# Patient Record
Sex: FEMALE | Race: WHITE | NOT HISPANIC OR LATINO | URBAN - METROPOLITAN AREA
[De-identification: names, ages, dates, MRNs, and addresses within clinical notes are randomized per-mention and may not be internally consistent; named-entity substitution may affect disease eponyms.]

---

## 2018-04-17 ENCOUNTER — DOCTOR'S OFFICE (OUTPATIENT)
Dept: URBAN - METROPOLITAN AREA CLINIC 165 | Facility: CLINIC | Age: 83
Setting detail: OPHTHALMOLOGY
End: 2018-04-17
Payer: MEDICARE

## 2018-04-17 DIAGNOSIS — D31.32: ICD-10-CM

## 2018-04-17 DIAGNOSIS — H57.12: ICD-10-CM

## 2018-04-17 DIAGNOSIS — H04.123: ICD-10-CM

## 2018-04-17 DIAGNOSIS — H25.013: ICD-10-CM

## 2018-04-17 DIAGNOSIS — H01.001: ICD-10-CM

## 2018-04-17 DIAGNOSIS — H43.812: ICD-10-CM

## 2018-04-17 DIAGNOSIS — H25.043: ICD-10-CM

## 2018-04-17 DIAGNOSIS — H25.13: ICD-10-CM

## 2018-04-17 DIAGNOSIS — H11.153: ICD-10-CM

## 2018-04-17 PROCEDURE — 92014 COMPRE OPH EXAM EST PT 1/>: CPT | Performed by: OPHTHALMOLOGY

## 2018-04-17 PROCEDURE — 92250 FUNDUS PHOTOGRAPHY W/I&R: CPT | Performed by: OPHTHALMOLOGY

## 2018-04-17 ASSESSMENT — SPHEQUIV_DERIVED
OS_SPHEQUIV: 0
OD_SPHEQUIV: 0.875
OD_SPHEQUIV: 0.5
OS_SPHEQUIV: 0.125

## 2018-04-17 ASSESSMENT — REFRACTION_MANIFEST
OD_VA2: 20/20
OD_VA3: 20/
OD_VA1: 20/60+
OS_SPHERE: -0.75
OD_SPHERE: -0.50
OS_ADD: +3.00
OD_AXIS: 170
OU_VA: 20/
OS_VA1: 20/
OS_VA3: 20/
OD_CYLINDER: +2.75
OS_VA2: 20/
OU_VA: 20/
OS_AXIS: 015
OS_CYLINDER: +1.50
OD_VA2: 20/
OS_VA3: 20/
OS_VA2: 20/BINOC
OD_VA1: 20/
OD_VA3: 20/
OD_ADD: +3.00
OS_VA1: 20/30-

## 2018-04-17 ASSESSMENT — LID EXAM ASSESSMENTS
OS_MEIBOMITIS: LUL T 1+
OD_MEIBOMITIS: RUL T 1+
OS_BLEPHARITIS: ABSENT
OD_BLEPHARITIS: ABSENT

## 2018-04-17 ASSESSMENT — REFRACTION_CURRENTRX
OD_VPRISM_DIRECTION: SV
OS_OVR_VA: 20/
OD_OVR_VA: 20/
OD_OVR_VA: 20/
OS_VPRISM_DIRECTION: SV
OD_SPHERE: +2.00
OS_OVR_VA: 20/
OS_OVR_VA: 20/
OS_SPHERE: +2.00
OD_OVR_VA: 20/

## 2018-04-17 ASSESSMENT — CONFRONTATIONAL VISUAL FIELD TEST (CVF)
OS_FINDINGS: FULL
OD_FINDINGS: FULL

## 2018-04-17 ASSESSMENT — VISUAL ACUITY: OD_BCVA: 20/30+

## 2018-04-17 ASSESSMENT — DECREASING TEAR LAKE - SEVERITY SCORE
OS_DEC_TEARLAKE: T 1+
OD_DEC_TEARLAKE: T 1+

## 2018-04-17 ASSESSMENT — REFRACTION_AUTOREFRACTION
OS_CYLINDER: +1.75
OD_AXIS: 170
OS_SPHERE: -0.75
OS_AXIS: 008
OD_SPHERE: -1.00
OD_CYLINDER: +3.00

## 2018-04-17 ASSESSMENT — LID POSITION - DERMATOCHALASIS
OD_DERMATOCHALASIS: RUL 2+
OS_DERMATOCHALASIS: LUL 2+

## 2019-09-24 ENCOUNTER — DOCTOR'S OFFICE (OUTPATIENT)
Dept: URBAN - METROPOLITAN AREA CLINIC 165 | Facility: CLINIC | Age: 84
Setting detail: OPHTHALMOLOGY
End: 2019-09-24
Payer: MEDICARE

## 2019-09-24 DIAGNOSIS — D31.32: ICD-10-CM

## 2019-09-24 DIAGNOSIS — H25.013: ICD-10-CM

## 2019-09-24 DIAGNOSIS — H43.812: ICD-10-CM

## 2019-09-24 DIAGNOSIS — H57.12: ICD-10-CM

## 2019-09-24 DIAGNOSIS — H25.13: ICD-10-CM

## 2019-09-24 DIAGNOSIS — H04.123: ICD-10-CM

## 2019-09-24 DIAGNOSIS — H25.043: ICD-10-CM

## 2019-09-24 DIAGNOSIS — H11.153: ICD-10-CM

## 2019-09-24 DIAGNOSIS — H01.001: ICD-10-CM

## 2019-09-24 PROCEDURE — 92014 COMPRE OPH EXAM EST PT 1/>: CPT | Performed by: OPHTHALMOLOGY

## 2019-09-24 PROCEDURE — 92250 FUNDUS PHOTOGRAPHY W/I&R: CPT | Performed by: OPHTHALMOLOGY

## 2019-09-24 ASSESSMENT — SPHEQUIV_DERIVED
OS_SPHEQUIV: 0.125
OD_SPHEQUIV: 0.875
OD_SPHEQUIV: 0.5
OS_SPHEQUIV: 0

## 2019-09-24 ASSESSMENT — REFRACTION_MANIFEST
OD_VA1: 20/
OS_VA2: 20/
OS_VA1: 20/30-
OS_VA1: 20/
OS_VA2: 20/BINOC
OD_VA3: 20/
OD_VA2: 20/20
OS_AXIS: 015
OS_VA3: 20/
OD_ADD: +3.00
OD_SPHERE: -0.50
OD_VA3: 20/
OS_SPHERE: -0.75
OD_VA2: 20/
OS_VA3: 20/
OS_ADD: +3.00
OD_CYLINDER: +2.75
OU_VA: 20/
OU_VA: 20/
OS_CYLINDER: +1.50
OD_AXIS: 170
OD_VA1: 20/60+

## 2019-09-24 ASSESSMENT — REFRACTION_CURRENTRX
OD_VPRISM_DIRECTION: SV
OS_OVR_VA: 20/
OD_SPHERE: +2.50
OS_OVR_VA: 20/
OS_VPRISM_DIRECTION: SV
OS_SPHERE: +2.50
OS_OVR_VA: 20/
OD_OVR_VA: 20/

## 2019-09-24 ASSESSMENT — REFRACTION_AUTOREFRACTION
OD_SPHERE: -1.00
OD_CYLINDER: +3.00
OS_AXIS: 008
OS_CYLINDER: +1.75
OD_AXIS: 170
OS_SPHERE: -0.75

## 2019-09-24 ASSESSMENT — VISUAL ACUITY
OD_BCVA: 20/30+1
OS_BCVA: 20/200

## 2019-09-24 ASSESSMENT — CONFRONTATIONAL VISUAL FIELD TEST (CVF)
OD_FINDINGS: FULL
OS_FINDINGS: FULL

## 2019-09-24 ASSESSMENT — DECREASING TEAR LAKE - SEVERITY SCORE
OS_DEC_TEARLAKE: T 1+
OD_DEC_TEARLAKE: T 1+

## 2019-09-24 ASSESSMENT — LID POSITION - DERMATOCHALASIS
OD_DERMATOCHALASIS: RLL RUL 2+
OS_DERMATOCHALASIS: LLL LUL 2+

## 2019-09-24 ASSESSMENT — LID EXAM ASSESSMENTS
OD_MEIBOMITIS: RUL T 1+
OD_BLEPHARITIS: ABSENT
OS_BLEPHARITIS: ABSENT
OS_MEIBOMITIS: LUL T 1+

## 2020-02-25 ENCOUNTER — DOCTOR'S OFFICE (OUTPATIENT)
Dept: URBAN - METROPOLITAN AREA CLINIC 165 | Facility: CLINIC | Age: 85
Setting detail: OPHTHALMOLOGY
End: 2020-02-25
Payer: MEDICARE

## 2020-02-25 DIAGNOSIS — H01.001: ICD-10-CM

## 2020-02-25 DIAGNOSIS — H01.002: ICD-10-CM

## 2020-02-25 DIAGNOSIS — H57.12: ICD-10-CM

## 2020-02-25 DIAGNOSIS — H25.013: ICD-10-CM

## 2020-02-25 DIAGNOSIS — H25.13: ICD-10-CM

## 2020-02-25 DIAGNOSIS — H43.812: ICD-10-CM

## 2020-02-25 DIAGNOSIS — H25.043: ICD-10-CM

## 2020-02-25 DIAGNOSIS — H01.005: ICD-10-CM

## 2020-02-25 DIAGNOSIS — D31.32: ICD-10-CM

## 2020-02-25 DIAGNOSIS — H11.153: ICD-10-CM

## 2020-02-25 DIAGNOSIS — H04.123: ICD-10-CM

## 2020-02-25 DIAGNOSIS — H01.004: ICD-10-CM

## 2020-02-25 PROCEDURE — 92015 DETERMINE REFRACTIVE STATE: CPT | Performed by: OPHTHALMOLOGY

## 2020-02-25 PROCEDURE — 92014 COMPRE OPH EXAM EST PT 1/>: CPT | Performed by: OPHTHALMOLOGY

## 2020-02-25 ASSESSMENT — REFRACTION_AUTOREFRACTION
OD_AXIS: 149
OS_AXIS: 032
OD_SPHERE: -1.25
OS_SPHERE: -1.00
OD_CYLINDER: +4.00
OS_CYLINDER: +2.00

## 2020-02-25 ASSESSMENT — CONFRONTATIONAL VISUAL FIELD TEST (CVF)
OD_FINDINGS: FULL
OS_FINDINGS: FULL

## 2020-02-25 ASSESSMENT — REFRACTION_MANIFEST
OS_VA1: 20/30+2
OS_VA3: 20/
OD_VA2: 20/
OS_AXIS: 025
OU_VA: 20/
OD_VA2: 20/50(J5)
OS_SPHERE: -0.75
OS_VA2: 20/
OD_VA3: 20/
OD_ADD: +3.50
OS_VA2: 20/25(J1)
OS_ADD: +3.50
OS_VA1: 20/
OD_CYLINDER: +3.00
OS_VA3: 20/
OU_VA: 20/
OS_CYLINDER: +1.50
OD_AXIS: 160
OD_VA1: 20/60+2
OD_SPHERE: -1.00
OD_VA3: 20/
OD_VA1: 20/

## 2020-02-25 ASSESSMENT — LID POSITION - DERMATOCHALASIS
OS_DERMATOCHALASIS: LUL 2+
OD_DERMATOCHALASIS: RUL 2+

## 2020-02-25 ASSESSMENT — REFRACTION_CURRENTRX
OD_SPHERE: +2.50
OS_VPRISM_DIRECTION: SV
OD_CYLINDER: SPH
OD_VPRISM_DIRECTION: SV
OS_OVR_VA: 20/
OS_OVR_VA: 20/
OD_OVR_VA: 20/
OS_OVR_VA: 20/
OD_OVR_VA: 20/
OS_CYLINDER: SPH
OD_OVR_VA: 20/
OS_SPHERE: +2.50

## 2020-02-25 ASSESSMENT — SPHEQUIV_DERIVED
OS_SPHEQUIV: 0
OS_SPHEQUIV: 0
OD_SPHEQUIV: 0.5
OD_SPHEQUIV: 0.75

## 2020-02-25 ASSESSMENT — AXIALLENGTH_DERIVED
OS_AL: 23.6379
OD_AL: 23.7192
OS_AL: 23.6379
OD_AL: 23.6211

## 2020-02-25 ASSESSMENT — KERATOMETRY
OD_K1POWER_DIOPTERS: 41.75
OS_K2POWER_DIOPTERS: 44.00
OS_K1POWER_DIOPTERS: 42.75
OD_K2POWER_DIOPTERS: 43.50
OS_AXISANGLE_DEGREES: 016
METHOD_AUTO_MANUAL: AUTO
OD_AXISANGLE_DEGREES: 166

## 2020-02-25 ASSESSMENT — VISUAL ACUITY
OD_BCVA: 20/30+1
OS_BCVA: 20/400

## 2020-02-25 ASSESSMENT — LID EXAM ASSESSMENTS
OD_MEIBOMITIS: RLL RUL 1+
OS_MEIBOMITIS: LLL LUL 1+

## 2020-02-25 ASSESSMENT — DECREASING TEAR LAKE - SEVERITY SCORE
OD_DEC_TEARLAKE: T 1+
OS_DEC_TEARLAKE: T 1+

## 2020-02-26 PROBLEM — H01.005 BLEHPARITIS; RIGHT UPPER LID, RIGHT LOWER LID, LEFT UPPER LID, LEFT LOWER LID: Status: ACTIVE | Noted: 2020-02-25

## 2020-02-26 PROBLEM — H01.001 BLEPHARITIS; RIGHT UPPER LID, RIGHT LOWER LID, LEFT UPPER LID, LEFT LOWER LID: Status: ACTIVE | Noted: 2020-02-25

## 2020-02-26 PROBLEM — H01.005 BLEPHARITIS; RIGHT UPPER LID, RIGHT LOWER LID, LEFT UPPER LID, LEFT LOWER LID: Status: ACTIVE | Noted: 2020-02-25

## 2020-02-26 PROBLEM — H01.004 BLEHPARITIS; RIGHT UPPER LID, RIGHT LOWER LID, LEFT UPPER LID, LEFT LOWER LID: Status: ACTIVE | Noted: 2020-02-25

## 2020-02-26 PROBLEM — H01.002 BLEPHARITIS; RIGHT UPPER LID, RIGHT LOWER LID, LEFT UPPER LID, LEFT LOWER LID: Status: ACTIVE | Noted: 2020-02-25

## 2020-02-26 PROBLEM — H25.043 CATARACT POST SUBCAPSULAR; BOTH EYES: Status: ACTIVE | Noted: 2018-04-17

## 2020-02-26 PROBLEM — H01.001 BLEHPARITIS; RIGHT UPPER LID, RIGHT LOWER LID, LEFT UPPER LID, LEFT LOWER LID: Status: ACTIVE | Noted: 2020-02-25

## 2020-02-26 PROBLEM — H01.004 BLEPHARITIS; RIGHT UPPER LID, RIGHT LOWER LID, LEFT UPPER LID, LEFT LOWER LID: Status: ACTIVE | Noted: 2020-02-25

## 2020-02-26 PROBLEM — H01.002 BLEHPARITIS; RIGHT UPPER LID, RIGHT LOWER LID, LEFT UPPER LID, LEFT LOWER LID: Status: ACTIVE | Noted: 2020-02-25

## 2022-12-26 ENCOUNTER — APPOINTMENT (EMERGENCY)
Dept: RADIOLOGY | Facility: HOSPITAL | Age: 87
End: 2022-12-26

## 2022-12-26 ENCOUNTER — HOSPITAL ENCOUNTER (EMERGENCY)
Facility: HOSPITAL | Age: 87
Discharge: HOME | End: 2022-12-26
Attending: EMERGENCY MEDICINE

## 2022-12-26 VITALS
HEART RATE: 75 BPM | BODY MASS INDEX: 22.15 KG/M2 | HEIGHT: 63 IN | RESPIRATION RATE: 18 BRPM | OXYGEN SATURATION: 96 % | WEIGHT: 125 LBS | SYSTOLIC BLOOD PRESSURE: 167 MMHG | DIASTOLIC BLOOD PRESSURE: 78 MMHG | TEMPERATURE: 97.1 F

## 2022-12-26 DIAGNOSIS — R91.8 PULMONARY NODULES/LESIONS, MULTIPLE: Primary | ICD-10-CM
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
## 2022-12-26 DIAGNOSIS — M54.6 ACUTE RIGHT-SIDED THORACIC BACK PAIN: ICD-10-CM

## 2022-12-26 RX ORDER — LIDOCAINE 50 MG/G
1 PATCH TOPICAL DAILY
Qty: 14 PATCH | Refills: 0 | Status: SHIPPED | OUTPATIENT
Start: 2022-12-26 | End: 2023-01-09

## 2022-12-26 RX ORDER — HYDROMORPHONE HCL/PF 1 MG/ML
0.5 SYRINGE (ML) INJECTION ONCE
Status: COMPLETED | OUTPATIENT
Start: 2022-12-26 | End: 2022-12-26

## 2022-12-26 RX ORDER — IBUPROFEN 400 MG/1
400 TABLET ORAL EVERY 6 HOURS PRN
Qty: 40 TABLET | Refills: 0 | Status: SHIPPED | OUTPATIENT
Start: 2022-12-26 | End: 2023-01-05

## 2022-12-26 RX ORDER — ACETAMINOPHEN 325 MG/1
650 TABLET ORAL ONCE
Status: COMPLETED | OUTPATIENT
Start: 2022-12-26 | End: 2022-12-26

## 2022-12-26 RX ORDER — OXYCODONE HYDROCHLORIDE 5 MG/1
5 TABLET ORAL EVERY 4 HOURS PRN
Qty: 6 TABLET | Refills: 0 | Status: SHIPPED | OUTPATIENT
Start: 2022-12-26 | End: 2022-12-29

## 2022-12-26 RX ORDER — ACETAMINOPHEN 325 MG/1
650 TABLET ORAL EVERY 6 HOURS
Qty: 24 TABLET | Refills: 0 | Status: SHIPPED | OUTPATIENT
Start: 2022-12-26 | End: 2022-12-29

## 2022-12-26 RX ADMIN — ACETAMINOPHEN 650 MG: 325 TABLET, FILM COATED ORAL at 14:04

## 2022-12-26 RX ADMIN — HYDROMORPHONE HYDROCHLORIDE 0.5 MG: 1 INJECTION, SOLUTION INTRAMUSCULAR; INTRAVENOUS; SUBCUTANEOUS at 14:05

## 2022-12-26 NOTE — ED PROVIDER NOTES
History  Chief Complaint   Patient presents with   • Back Pain     Fell 2 days ago, treated at [facility] for back pain  Was up and around yesterday and woke up today unable to move and has worse back pain     Patient is an 57-year-old female with past medical history significant for heart surgery and hypertension, who presents for evaluation of back pain  Patient states that 2 days ago, she fell and went to [facility] for evaluation  At that time CT head and x-rays of spine were reported as normal   Patient was in bed most of the day yesterday, but did get up to come to dinner, and was able to navigate the stairs independently  Today, patient had worsening back pain and was unable to get out of bed or ambulate  Patient took Tylenol without improvement in her pain  Patient states the pain is located in her right thoracic cage and radiates to her side  She describes the pain as stabbing, shooting and stiff  She has not noted any remitting factors  She states that pain worsens with movement    She denies fever, chills, nausea, vomiting, numbness, weakness,      History provided by:  Patient and caregiver (Son at bedside)  Back Pain  Location:  Thoracic spine  Quality:  Stabbing, stiffness and shooting  Stiffness is present:  All day  Radiates to:  Does not radiate  Pain severity:  Severe  Pain is:  Unable to specify  Onset quality:  Gradual  Duration:  2 days  Timing:  Constant  Progression:  Worsening  Chronicity:  New  Context: recent injury    Context: not emotional stress, not falling, not jumping from heights, not lifting heavy objects, not MCA, not MVA, not occupational injury, not pedestrian accident, not physical stress, not recent illness and not twisting    Context comment:  Recent fall  Relieved by:  Nothing  Worsened by:  Ambulation, twisting, standing and movement  Ineffective treatments:  OTC medications  Associated symptoms: no abdominal pain, no abdominal swelling, no bladder incontinence, no bowel incontinence, no chest pain, no dysuria, no fever, no headaches, no leg pain, no numbness, no paresthesias, no pelvic pain, no perianal numbness, no tingling, no weakness and no weight loss    Risk factors: no hx of cancer, no hx of osteoporosis, no lack of exercise, not obese, no recent surgery, no steroid use and no vascular disease        None       Past Medical History:   Diagnosis Date   • Hypertension        Past Surgical History:   Procedure Laterality Date   • CARDIAC SURGERY         History reviewed  No pertinent family history  I have reviewed and agree with the history as documented  E-Cigarette/Vaping     E-Cigarette/Vaping Substances     Social History     Tobacco Use   • Smoking status: Never   • Smokeless tobacco: Never       Review of Systems   Constitutional: Negative for chills, fever and weight loss  HENT: Negative  Negative for ear pain and sore throat  Eyes: Negative  Negative for pain and visual disturbance  Respiratory: Negative for cough and shortness of breath  Cardiovascular: Negative for chest pain and palpitations  Gastrointestinal: Negative for abdominal pain, bowel incontinence and vomiting  Endocrine: Negative  Genitourinary: Negative for bladder incontinence, dysuria, hematuria and pelvic pain  Musculoskeletal: Positive for back pain, gait problem and myalgias  Negative for arthralgias  Skin: Negative for color change and rash  Allergic/Immunologic: Negative  Neurological: Negative for tingling, seizures, syncope, weakness, numbness, headaches and paresthesias  Hematological: Negative  Psychiatric/Behavioral: Negative  All other systems reviewed and are negative  Physical Exam  Physical Exam  Vitals and nursing note reviewed  Constitutional:       General: She is not in acute distress  Appearance: Normal appearance  She is well-developed and normal weight  She is not ill-appearing     HENT:      Head: Normocephalic and atraumatic  Right Ear: Tympanic membrane, ear canal and external ear normal       Left Ear: Tympanic membrane, ear canal and external ear normal       Nose: Nose normal       Mouth/Throat:      Mouth: Mucous membranes are moist    Eyes:      General: No scleral icterus  Conjunctiva/sclera: Conjunctivae normal       Pupils: Pupils are equal, round, and reactive to light  Cardiovascular:      Rate and Rhythm: Normal rate and regular rhythm  Pulses: Normal pulses  Heart sounds: Normal heart sounds  No murmur heard  Pulmonary:      Effort: Pulmonary effort is normal  No respiratory distress  Breath sounds: Normal breath sounds  Abdominal:      General: Abdomen is flat  Bowel sounds are normal       Palpations: Abdomen is soft  Tenderness: There is no abdominal tenderness  Musculoskeletal:         General: No swelling  Arms:       Cervical back: Normal range of motion and neck supple  Thoracic back: Spasms and tenderness present  Decreased range of motion  Skin:     General: Skin is warm and dry  Capillary Refill: Capillary refill takes less than 2 seconds  Neurological:      General: No focal deficit present  Mental Status: She is alert and oriented to person, place, and time  Cranial Nerves: No cranial nerve deficit     Psychiatric:         Mood and Affect: Mood normal          Behavior: Behavior normal          Vital Signs  ED Triage Vitals   Temperature Pulse Respirations Blood Pressure SpO2   12/26/22 1249 12/26/22 1249 12/26/22 1249 12/26/22 1249 12/26/22 1249   (!) 97 1 °F (36 2 °C) 81 18 162/79 96 %      Temp src Heart Rate Source Patient Position - Orthostatic VS BP Location FiO2 (%)   -- 12/26/22 1408 12/26/22 1408 12/26/22 1408 --    Monitor Lying Right arm       Pain Score       12/26/22 1249       9           Vitals:    12/26/22 1249 12/26/22 1408   BP: 162/79 167/78   Pulse: 81 75   Patient Position - Orthostatic VS:  Lying Visual Acuity      ED Medications  Medications   HYDROmorphone (DILAUDID) injection 0 5 mg (0 5 mg Intravenous Given 12/26/22 1405)   acetaminophen (TYLENOL) tablet 650 mg (650 mg Oral Given 12/26/22 1404)       Diagnostic Studies  Results Reviewed     None                 CT spine thoracic & lumbar wo contrast   Final Result by Dominguez Alejandra MD (12/26 7953)         1  Numerous (at least 10) small bilateral pulmonary nodules are indeterminant  Both benign and malignant etiologies should be considered in the differential diagnosis, pulmonary metastasis the diagnosis of exclusion  Consider staging assessment with    CT of the chest abdomen pelvis preferably with oral and IV contrast for further assessment  Considerations related to the patient's age and/or comorbidities may be used to alter these recommendations      2  No acute fracture detected  3   Moderate spondylosis and levoscoliosis of the upper lumbar spine  4   Healed old left 4th and 5th rib fractures  Workstation performed: [id number]                    Procedures  Procedures         ED Course                               SBIRT 20yo+    Flowsheet Row Most Recent Value   SBIRT (23 yo +)    In order to provide better care to our patients, we are screening all of our patients for alcohol and drug use  Would it be okay to ask you these screening questions?  No Filed at: 12/26/2022 1254                    MDM  Number of Diagnoses or Management Options  Acute right-sided thoracic back pain: new and requires workup  Fall, initial encounter: new and requires workup  Pulmonary nodules/lesions, multiple: new and requires workup  Diagnosis management comments: Right-sided thoracic back pain after a fall  Improvement in pain with administration of Dilaudid and Tylenol in the ED  Patient ambulated without difficulty  CT without evidence of acute fracture    CT does demonstrate multiple pulmonary nodules  Discussed at length with patient and son  She will follow-up with her primary care doctor for further evaluation  She does recognize that these could represent a neoplastic process and should be evaluated       Amount and/or Complexity of Data Reviewed  Clinical lab tests: reviewed  Tests in the radiology section of CPT®: ordered and reviewed  Tests in the medicine section of CPT®: reviewed  Decide to obtain previous medical records or to obtain history from someone other than the patient: yes  Review and summarize past medical records: yes  Independent visualization of images, tracings, or specimens: yes    Risk of Complications, Morbidity, and/or Mortality  Presenting problems: moderate  Diagnostic procedures: moderate  Management options: moderate        Disposition  Final diagnoses:   Pulmonary nodules/lesions, multiple   Acute right-sided thoracic back pain   Fall, initial encounter     Time reflects when diagnosis was documented in both MDM as applicable and the Disposition within this note     Time User Action Codes Description Comment    12/26/2022  4:31 PM Naomi Pritchard [R91 8] Pulmonary nodules/lesions, multiple     12/26/2022  4:32 PM Naomi Pritchard [M54 6] Acute right-sided thoracic back pain     12/26/2022  4:48 PM Naomi Pritchard [W19  Lavanda Bills, initial encounter       ED Disposition     ED Disposition   Discharge    Condition   Stable    Date/Time   Mon Dec 26, 2022  4:48 PM    Comment   Ryan Morning discharge to home/self care                 Follow-up Information     Follow up With Specialties Details Why Contact Info Additional Information    [facility] Pain Medicine Schedule an appointment as soon as possible for a visit   Radames Mendes [id number], [facility], [address] [place] [address] Emergency Department Emergency Medicine Go to  If symptoms worsen [address] [id number] [address] [address] Emergency Department, [facility], [address] [facility] Schedule an appointment as soon as possible for a visit   [address] [phone]  [address] [facility], [address] [facility], [address]          Discharge Medication List as of 12/26/2022  4:48 PM      START taking these medications    Details   acetaminophen (TYLENOL) 325 mg tablet Take 2 tablets (650 mg total) by mouth every 6 (six) hours for 3 days, Starting Mon 12/26/2022, Until Thu 12/29/2022, Normal      ibuprofen (MOTRIN) 400 mg tablet Take 1 tablet (400 mg total) by mouth every 6 (six) hours as needed for moderate pain for up to 10 days, Starting Mon 12/26/2022, Until Thu 1/5/2023 at 2359, Normal      lidocaine (Lidoderm) 5 % Apply 1 patch topically daily for 14 days Remove & Discard patch within 12 hours or as directed by MD, Starting Mon 12/26/2022, Until Mon 1/9/2023, Normal      oxyCODONE (ROXICODONE) 5 immediate release tablet Take 1 tablet (5 mg total) by mouth every 4 (four) hours as needed for moderate pain for up to 3 days Max Daily Amount: 30 mg, Starting Mon 12/26/2022, Until Thu 12/29/2022 at 2359, Normal                 PDMP Review     None          ED Provider  Electronically Signed by           Jamal Morales PA-C  12/26/22 1955

## 2022-12-28 ENCOUNTER — TELEPHONE (OUTPATIENT)
Dept: PULMONOLOGY | Facility: MEDICAL CENTER | Age: 87
End: 2022-12-28

## 2022-12-28 ENCOUNTER — TELEPHONE (OUTPATIENT)
Dept: PHYSICAL THERAPY | Facility: OTHER | Age: 87
End: 2022-12-28

## 2022-12-28 NOTE — TELEPHONE ENCOUNTER
Spoke with patients son attempted to schedule NP appointment, son stated patient is not able to come in to office at the time, due to health issues   Referral closed

## 2022-12-28 NOTE — TELEPHONE ENCOUNTER
Nurse reached out to discuss recent referral entered for  Comprehensive Spine program   This RN called preferred contact number listed [facility])  in pt demographics  Message left for patient regarding referral entered for  [facility] Spine Program    Contact information, hours of operation and VM instructions left  Nurse requested patient/SON to CB if needed and leave Full Name &  on VM       Referral deferred for f/u attempt per protocol